# Patient Record
(demographics unavailable — no encounter records)

---

## 2024-12-03 NOTE — HISTORY OF PRESENT ILLNESS
[de-identified] : Pt is an 80 y/o male with left knee pain. He is status post left tibial plateau fracture years ago. He denies new history of trauma. He has pain which is constant. He has been told in the past that he does have advanced arthritis at the left knee. He has had prior cortisone injections, most recently on 5/2/22, which he states did initially help. \par  He additionally complains of left hip pain. He denies injury in this regard as well. He localizes his symptoms to the gluteal area. He denies numbness or tingling to the lower extremity.

## 2024-12-03 NOTE — PHYSICAL EXAM
[de-identified] : Patient is WDWN, alert, and in no acute distress. Breathing is unlabored. He is grossly oriented to person, place, and time.\par  \par  He is accompanied by his wife today.\par  \par  Left Lower Extremity:\par  No radicular symptoms to the left lower extremity. \par  Left sided paraspinal tenderness as well as through the left sided gluteal region.\par  There is a well healed incision site along the lateral aspect of the tibia.\par  No signs of infection noted.\par  There is tenderness noted posteriorly through the knee, with lateral joint line tenderness.\par  ROM to the left knee is full into flexion and extension.  [de-identified] : AP, lateral, oblique views of the LEFT knee were obtained and revealed a fully healed tibial plateau fracture with lateral plate and multiple screws in place. There is severe lateral compartment osteoarthritis.   AP and lateral views of the LEFT hip and pelvis were obtained today and revealed mild arthritic changes to the femoroacetabular joint.

## 2024-12-03 NOTE — HISTORY OF PRESENT ILLNESS
[de-identified] : Pt is an 82 y/o male with left knee pain. He is status post left tibial plateau fracture years ago. He denies new history of trauma. He has pain which is constant. He has been told in the past that he does have advanced arthritis at the left knee. He has had prior cortisone injections, most recently on 5/2/22, which he states did initially help. \par  He additionally complains of left hip pain. He denies injury in this regard as well. He localizes his symptoms to the gluteal area. He denies numbness or tingling to the lower extremity.

## 2024-12-03 NOTE — DISCUSSION/SUMMARY
[de-identified] : The underlying pathophysiology was reviewed with the patient. XR films were reviewed with the patient. Discussed at length the nature of the patient's condition. I informed the patient that his left knee symptoms are consistent with advanced arthritis of the lateral compartment. The patient and I discussed his options regarding treatment.  Patient was advised to take OTC medications and topical analgesic for pain management. He and his wife have deferred a new replacement, as she stated she has previously undergone a TKR and is not happy with her result. He has opted to proceed with a repeat cortisone injection at the left knee, given his relief in the past with prior cortisone injections.  I expressed to the patient that at this time, it would be in his best interest to obtain additional studies in the form of an MRI. I provided the patient a prescription to obtain an MRI of his Lumbar Spine.   The patient wishes to proceed with a cortisone injection at this time. The skin was prepped with alcohol and sprayed with Ethyl Chloride. An injection of 0.5 cc 1% Lidocaine without epinephrine, 0.25 cc Kenalog 40mg, and 0.25 cc. Dexamethasone was administered into the left knee. The patient tolerated the procedure well. Apply ice.  All questions answered, understanding verbalized. Patient in agreement with plan of care.   If the patient begins to experience any changes or severe exacerbation of his symptoms, he should reach out to me as soon as possible. Otherwise, he should return to me as needed.

## 2024-12-03 NOTE — PHYSICAL EXAM
[de-identified] : Patient is WDWN, alert, and in no acute distress. Breathing is unlabored. He is grossly oriented to person, place, and time.\par  \par  He is accompanied by his wife today.\par  \par  Left Lower Extremity:\par  No radicular symptoms to the left lower extremity. \par  Left sided paraspinal tenderness as well as through the left sided gluteal region.\par  There is a well healed incision site along the lateral aspect of the tibia.\par  No signs of infection noted.\par  There is tenderness noted posteriorly through the knee, with lateral joint line tenderness.\par  ROM to the left knee is full into flexion and extension.  [de-identified] : AP, lateral, oblique views of the LEFT knee were obtained and revealed a fully healed tibial plateau fracture with lateral plate and multiple screws in place. There is severe lateral compartment osteoarthritis.   AP and lateral views of the LEFT hip and pelvis were obtained today and revealed mild arthritic changes to the femoroacetabular joint.

## 2024-12-03 NOTE — ADDENDUM
[FreeTextEntry1] : I, Phillip Posada Jr, acted solely as a scribe for Dr. Herminio Marc on this date 12/03/2024  All medical record entries made by the Scribe were at my, Dr. Herminio Marc, direction and personally dictated by me on 12/03/2024 . I have reviewed the chart and agree that the record accurately reflects my personal performance of the history, physical exam, assessment and plan. I have also personally directed, reviewed, and agreed with the chart.

## 2024-12-03 NOTE — DISCUSSION/SUMMARY
[de-identified] : The underlying pathophysiology was reviewed with the patient. XR films were reviewed with the patient. Discussed at length the nature of the patient's condition. I informed the patient that his left knee symptoms are consistent with advanced arthritis of the lateral compartment. The patient and I discussed his options regarding treatment.  Patient was advised to take OTC medications and topical analgesic for pain management. He and his wife have deferred a new replacement, as she stated she has previously undergone a TKR and is not happy with her result. He has opted to proceed with a repeat cortisone injection at the left knee, given his relief in the past with prior cortisone injections.  I expressed to the patient that at this time, it would be in his best interest to obtain additional studies in the form of an MRI. I provided the patient a prescription to obtain an MRI of his Lumbar Spine.   The patient wishes to proceed with a cortisone injection at this time. The skin was prepped with alcohol and sprayed with Ethyl Chloride. An injection of 0.5 cc 1% Lidocaine without epinephrine, 0.25 cc Kenalog 40mg, and 0.25 cc. Dexamethasone was administered into the left knee. The patient tolerated the procedure well. Apply ice.  All questions answered, understanding verbalized. Patient in agreement with plan of care.   If the patient begins to experience any changes or severe exacerbation of his symptoms, he should reach out to me as soon as possible. Otherwise, he should return to me as needed.

## 2024-12-23 NOTE — DISCUSSION/SUMMARY
[FreeTextEntry1] : Overall doing well from a cardiac standpoint. Continue present medications. Follow up in 6 months.  [EKG obtained to assist in diagnosis and management of assessed problem(s)] : EKG obtained to assist in diagnosis and management of assessed problem(s)

## 2024-12-23 NOTE — HISTORY OF PRESENT ILLNESS
[FreeTextEntry1] : Mr. HART has a history of CAD. He denies a change in symptoms. He has no chest pain. No SOB. No palpitations.

## 2025-02-24 NOTE — ASSESSMENT
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  Plan: Obtain hospital records - 2/24/25 Lupus anticoagulant panel/V Leiden/Prothrombin G all negative; CT A/P negative Eliquis for at least 1 year CMP, LDH Dermatology f/u 4-10 ml lavendar and 1-7 ml red to CLL Tissue Bank next visit COVID booster and RSV vaccine as recommended Folic acid 1 mg daily Aspirin 81 mg daily -- has coronary stent Switch to sublingual B12 1000 mcg daily TEDS stocking on left to groin Avoid NSAIDs Avoid trauma RTC 1 month.

## 2025-02-24 NOTE — PHYSICAL EXAM
[Restricted in physically strenuous activity but ambulatory and able to carry out work of a light or sedentary nature] : Status 1- Restricted in physically strenuous activity but ambulatory and able to carry out work of a light or sedentary nature, e.g., light house work, office work [Normal] : affect appropriate [de-identified] : Tattoos. Multiple up to 1 x 1 cm scaly macules on limbs and trunk. Senile purpura and bruises on arms.

## 2025-02-24 NOTE — PHYSICAL EXAM
[Restricted in physically strenuous activity but ambulatory and able to carry out work of a light or sedentary nature] : Status 1- Restricted in physically strenuous activity but ambulatory and able to carry out work of a light or sedentary nature, e.g., light house work, office work [Normal] : affect appropriate [de-identified] : Tattoos. Multiple up to 1 x 1 cm scaly macules on limbs and trunk. Senile purpura and bruises on arms.

## 2025-02-24 NOTE — CONSULT LETTER
[Dear  ___] : Dear  [unfilled], [Courtesy Letter:] : I had the pleasure of seeing your patient, [unfilled], in my office today. [Please see my note below.] : Please see my note below. [Sincerely,] : Sincerely, [FreeTextEntry2] : Dr. Fabian [FreeTextEntry3] : Rios Andrade M.D., FACP\par  Professor of Medicine\par  [employer] [facility] [employer]\par  Associate Chief, Division of Hematology\par  [facility]\par  [facility]\par  [address] [place] [address]\[place] [address]\par  [phone]\par  \par  \par  \par

## 2025-02-24 NOTE — CONSULT LETTER
[Dear  ___] : Dear  [unfilled], [Courtesy Letter:] : I had the pleasure of seeing your patient, [unfilled], in my office today. [Please see my note below.] : Please see my note below. [Sincerely,] : Sincerely, [FreeTextEntry2] : Dr. Fabian [FreeTextEntry3] : Rios Andrade M.D., FACP\par  Professor of Medicine\par  [facility] [employer]\par  Associate Chief, Division of Hematology\par  [facility]\par  [facility]\par  [address] [place] [address]\[place] [address]\par  [phone]\par  \par  \par  \par

## 2025-02-24 NOTE — RESULTS/DATA
[FreeTextEntry1] : WBC 26,110 Hgb 12.4 Hct 37.9 .1 Platelets 184,000 Diff 11P 82L 2M 1Eo ANC 2,870  2/21/25 CMP Glu 121 eGFR 59   6/21/24 CMP Chloride 112 CO2 18 Glu 110 BUN 24  B12 399 Folate 11.2 Antiparietal Cell Ab <1:20 Intrinsic Factor Antibodies 1.1

## 2025-02-24 NOTE — HISTORY OF PRESENT ILLNESS
[Disease:__________________________] : Disease: [unfilled] [Chavez Stage: ___] : Chavez Stage: [unfilled] [de-identified] : 2/2014 Saddle pulmonary embolism with left femoral/soleal DVT [de-identified] : CD38--;IgVH mutated, ZAP70+; FISH -- [de-identified] : Patient was discharged on 2/14/25 following admission for left femoral and soleal deep veinous thrombophlebitis with saddle pulmonary embolism. He was anticoagulated with Heparin and then switched to Eliquis. He presented with back pain and pain in left calf and right thigh. He had not traveled nor been sedentary. He is feeling better. His back pain is improving. He reports  minimal dry cough at night which is resolving. He notes no fever, night sweats, SOB, swollen glands, abdominal pain, BRBPR, melena, bleeding, abnormal bruising. Weight is stable. He had the flu shot.

## 2025-02-24 NOTE — HISTORY OF PRESENT ILLNESS
[Disease:__________________________] : Disease: [unfilled] [Chavez Stage: ___] : Chavez Stage: [unfilled] [de-identified] : 2/2014 Saddle pulmonary embolism with left femoral/soleal DVT [de-identified] : CD38--;IgVH mutated, ZAP70+; FISH -- [de-identified] : Patient was discharged on 2/14/25 following admission for left femoral and soleal deep veinous thrombophlebitis with saddle pulmonary embolism. He was anticoagulated with Heparin and then switched to Eliquis. He presented with back pain and pain in left calf and right thigh. He had not traveled nor been sedentary. He is feeling better. His back pain is improving. He reports  minimal dry cough at night which is resolving. He notes no fever, night sweats, SOB, swollen glands, abdominal pain, BRBPR, melena, bleeding, abnormal bruising. Weight is stable. He had the flu shot.

## 2025-02-24 NOTE — ADDENDUM
[FreeTextEntry1] : I, Monique Turcios, acted solely as a scribe for Dr. Rios Andrade on 02/21/2025.   All medical entries made by the Scribe were at my, Dr. Rios Andrade's, direction and personally dictated by me on 02/21/2025 . I have reviewed the chart and agree that the record accurately reflects my personal performance of the history, physical exam, assessment and plan. I have also personally directed, reviewed, and agreed with the chart.

## 2025-03-05 NOTE — HISTORY OF PRESENT ILLNESS
[FreeTextEntry1] : Mr. HART presents for the evaluation of DVT and saddle PE He was noted to have saddle PE. Also noted to have left leg DVT. He does note some trauma to the leg in the past. He also recently had a long plane ride.  Since home he has felt well. NO chest pain. He has had a cough. Lungs were clear on exam and imaging.

## 2025-03-05 NOTE — DISCUSSION/SUMMARY
[FreeTextEntry1] : Int-high risk saddle PE Continue on Eliquis. Will discuss with Heme and Vascular cardiology duration of therapy. Seems hypercoag work up negative this far. He does have CLL, but no solid malignancy.  RV dysfunction noted on echo but no obvious signs or symptoms. Will repeat echo.  Same therapies for now. Follow up with Vascular Cardiology.  [EKG obtained to assist in diagnosis and management of assessed problem(s)] : EKG obtained to assist in diagnosis and management of assessed problem(s)

## 2025-03-10 NOTE — REVIEW OF SYSTEMS
[Weight Gain (___ Lbs)] : no recent weight gain [Feeling Fatigued] : not feeling fatigued [Weight Loss (___ Lbs)] : no recent weight loss [SOB] : no shortness of breath [Dyspnea on exertion] : not dyspnea during exertion [Chest Discomfort] : no chest discomfort [Lower Ext Edema] : no extremity edema [Leg Claudication] : no intermittent leg claudication [Palpitations] : no palpitations [Orthopnea] : no orthopnea [Syncope] : no syncope [Joint Pain] : joint pain [Joint Stiffness] : joint stiffness [Change In Color Of Skin] : change in skin color [Skin Lesions] : skin lesion(s): [Negative] : Heme/Lymph

## 2025-03-10 NOTE — REASON FOR VISIT
[Consultation] : a consultation regarding [Peripheral Vascular Disease] : peripheral vascular disease [FreeTextEntry1] : 83M w/ HTN, CAD, CLL. Recent admission for intermediate high risk PE, DVT. Here for vascular medicine evaluation.   Hospital course:  Presents w/ CP and back pain x1 day. Found to have saddle PE w/ right heart strain on imaging and elevated cardiac biomarkers w/ hemodynamic stability. Patient started on heparin gtt, and transitioned to DOAC as per recommendations of vascular cardiology.  Procedure: CTA chest w/w/o contrast Findings and Treatment: IMPRESSION: Acute saddle pulmonary emboli extending into all lobes, as described above, with suggestion of right heart strain. Mild bilateral peripheral reticular opacities, question interstitial lung disease.  Venous duplex: IMPRESSION: Deep venous thrombosis in the left femoral and soleal veins, above and below the knee.  CT abd/pelvis:  *  Prominent nonspecific periportal and gastrohepatic lymph nodes. *  Otherwise, noclear evidence of malignancy within the abdomen or pelvis.  TTE:  1. Left ventricular systolic function is normal.  2. Moderately enlarged right ventricular cavity size.  3. Right ventricular systolic function is reduced with apical sparing consistent with acute pulmonary embolism (Angeles's sign).  4. No clot in transit visualized.  5. Trace tricuspid regurgitation.  6. The inferior vena cava is dilated (dilated >2.1cm) with abnormal inspiratory collapse (abnormal <50%) consistent with elevated right atrial pressure ( R 15, range 10-20mmHg).  Managed with anticoagulation. Biopsy of LN deferred to outpatient. Thought to be provoked in the setting of CLL, multiple long flights and immobilization after falling in the ocean.

## 2025-03-10 NOTE — ASSESSMENT
[FreeTextEntry1] : #provoked PE/DVT (CLL, multiple airplane trips, sedentary after falling in the ocean). Feels well. Has been walking but not back to his exercise routine (walks on treadmill, light weights). No bleeding or bruising. Cardiologist is Dr. Negrete.   -therapeutic a/c; course to be determined -will need outpatient biopsy of LN -3 month sophia, TTE and venous duplex, 6MWT -reviewed signs and symptoms to seek emergency care -he can start back with his walking, slow uptaper as long as he feels well -f/u after above testing is completed  -f/u hematology

## 2025-03-10 NOTE — PHYSICAL EXAM
[Eyelids - No Xanthelasma] : the eyelids demonstrated no xanthelasmas [No Oral Cyanosis] : no oral cyanosis [Heart Rate And Rhythm] : heart rate and rhythm were normal [Heart Sounds] : normal S1 and S2 [Murmurs] : no murmurs present [Arterial Pulses Normal] : the arterial pulses were normal [Bowel Sounds] : normal bowel sounds [Abdomen Tenderness] : non-tender [] : no ischemic changes [FreeTextEntry1] : +spider veins [No Skin Ulcers] : no skin ulcer [Oriented To Time, Place, And Person] : oriented to person, place, and time [Impaired Insight] : insight and judgment were intact [Affect] : the affect was normal

## 2025-03-21 NOTE — RESULTS/DATA
[FreeTextEntry1] : WBC 23,530 Hgb 12.9 Hct 40.0 .2 Platelets 124,000 Diff 13P 82L 3M 1Eo ANC 3,020 ALC 19,300 CMP Glu 130, eGFR 70 LDH  150  6/21/24 B12 399 Folate 11.2 Antiparietal Cell Ab <1:20 Intrinsic Factor Antibodies 1.1

## 2025-03-21 NOTE — PHYSICAL EXAM
[Normal] : affect appropriate [Fully active, able to carry on all pre-disease performance without restriction] : Status 0 - Fully active, able to carry on all pre-disease performance without restriction [de-identified] : Tattoos. Multiple up to 1 x 1 cm scaly macules on limbs and trunk.

## 2025-03-21 NOTE — CONSULT LETTER
[Dear  ___] : Dear  [unfilled], [Courtesy Letter:] : I had the pleasure of seeing your patient, [unfilled], in my office today. [Please see my note below.] : Please see my note below. [Sincerely,] : Sincerely, [FreeTextEntry2] : Dr. Fabian [FreeTextEntry3] : Rios Andrade M.D., FACP\par  Professor of Medicine\par  [facility] [employer]\par  Associate Chief, Division of Hematology\par  [facility]\par  [facility]\par  [address] [place] [address]\[place] [address]\par  [phone]\par  \par  \par  \par

## 2025-03-21 NOTE — ADDENDUM
[FreeTextEntry1] : I, Brenda Danielito, acted solely as a scribe for Dr. Rios Andrade on 3/21/2025 .  All medical entries made by the Scribe were at my, Dr. Rios Andrade's, direction and personally dictated by me on 3/21/2025. I have reviewed the chart and agree that the record accurately reflects my personal performance of the history, physical exam, assessment and plan. I have also personally directed, reviewed, and agreed with the chart.

## 2025-03-21 NOTE — ASSESSMENT
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 Reviewed hospital records - 2/24/25 Lupus anticoagulant panel/V Leiden/Prothrombin G all negative; CT A/P negative  Plan: Continue Eliquis at full dose for at least 1 year. Would then continue lifelong on prophylactic dose. B12/folate levels Dermatology f/u 4-10 ml lavender and 1-7 ml red to CLL Tissue Bank next visit COVID booster and RSV vaccine as recommended Folic acid 1 mg daily Aspirin 81 mg daily -- has coronary stent Sublingual B12 1000 mcg daily TEDS stocking on left to groin Avoid NSAIDs Avoid trauma Consider Cologuard test RTC 3 months

## 2025-03-21 NOTE — HISTORY OF PRESENT ILLNESS
[Disease:__________________________] : Disease: [unfilled] [Chavez Stage: ___] : Chavez Stage: [unfilled] [de-identified] : 2/2025 Saddle pulmonary embolism with left femoral/soleal DVT, unprovoked [de-identified] : CD38--;IgVH mutated, ZAP70+; FISH -- [de-identified] :  He is feeling better. He notes no fever, night sweats, HA, visual problems, chest pain, cough, SOB, swollen glands, abdominal pain, BRBPR, melena, bleeding, bruising. Weight is fluctuating, gained 3lbs since last visit. He had the flu shot. Last colonoscopy was about 5 years ago and reportedly unremarkable.

## 2025-04-30 NOTE — HISTORY OF PRESENT ILLNESS
[FreeTextEntry1] : Repeat echo shows RV has returned to normal.  NO chest pain. No SOB His only complaint is low back pain and pain in the thighs.

## 2025-04-30 NOTE — DISCUSSION/SUMMARY
[FreeTextEntry1] : No DVT on dopplers. RV improved.  Suspect back and thigh pain is orthopedic. Rec he see PMD or ortho.  Will defer to heme and vascular duration and dose of anticoagulation.  Continue other cardiac medications.  [EKG obtained to assist in diagnosis and management of assessed problem(s)] : EKG obtained to assist in diagnosis and management of assessed problem(s)

## 2025-05-14 NOTE — HISTORY OF PRESENT ILLNESS
[FreeTextEntry1] : Carl presents with his wife for evaluation of anemia.  The patient was diagnosed with a saddle PE and DVT in 2/2025 when he was admitted at [facility].  He is currently on full dose Eliquis.  From a GI perspective, the patient feels well and denies any upper GI symptoms, abdominal pain, changes in bowel habits, overt GI bleeding, weight loss or FH of GI malignancies.  He reports a prior screening colonoscopy about 10 years ago and was advised he would not need to repeat it due to his age.  The patient has a history of CLL with a baseline hemoglobin of 13-14.  When the patient was admitted to [facility], his initial Hb was in the 13's but decreased to 11.3 on discharge.  His recent outpatient labs show improvement with Hb 12.4 on 2/21/2025 and 12.9 on 3/21/2025.

## 2025-05-14 NOTE — PHYSICAL EXAM
[Alert] : alert [Sclera] : the sclera and conjunctiva were normal [Hearing Threshold Finger Rub Not [place]] : hearing was normal [Normal Appearance] : the appearance of the neck was normal [No Respiratory Distress] : no respiratory distress [Auscultation Breath Sounds / Voice Sounds] : lungs were clear to auscultation bilaterally [Heart Rate And Rhythm] : heart rate was normal and rhythm regular [Normal S1, S2] : normal S1 and S2 [Bowel Sounds] : normal bowel sounds [Abdomen Tenderness] : non-tender [No Masses] : no abdominal mass palpated [Abdomen Soft] : soft [No Rectal Mass] : no rectal mass [Occult Blood] : negative occult blood [FIT Test] : negative FIT test [Abnormal Walk] : normal gait [Normal Color / Pigmentation] : normal skin color and pigmentation [No Focal Deficits] : no focal deficits [Oriented To Time, Place, And Person] : oriented to person, place, and time [de-identified] : external hemorrhoid, brown stool

## 2025-05-14 NOTE — ASSESSMENT
[FreeTextEntry1] : 1.  Acute anemia, with recent improvement.  FIT and Hemoccult negative today.  Last colonoscopy about 10 years ago. 2.  History of B12 deficiency.  Prior intrinsic factor and antiparietal cell antibody normal. 3.  Saddle PE/DVT (2/2025) on Eliquis. 4.  CLL. 5.  CAD s/p stent. 6.  HTN.  Recs: - Recent CBC, CMP, B12 reviewed.  Prior hospital lab trends and imaging studies reviewed. - FIT and Hemoccult testing negative today.  Patient was advised that Cologuard test can be ordered but may result in false positive in setting of Eliquis use. - Patient to follow-up with hematology for surveillance CBC.  If hemoglobin continues to improve, would favor conservative management given recent saddle PE on anticoagulation.

## 2025-05-29 NOTE — PHYSICAL EXAM
[de-identified] : Patient is WDWN, alert, and in no acute distress. Breathing is unlabored. He is grossly oriented to person, place, and time.  He is accompanied by his wife today.  Left knee: There is a healed midline scar Range of motion:15 extension - 120  flexion Tests and Signs: All tests for stability are normal. Strength: flexion and extension 5/5  Right Lower Extremity Buttock: No tenderness. No swelling. No deformities Hip: Inspection/Palpation: No tenderness. No swelling. No deformities Range of Motion: full and extension, flexion, adduction, abduction, internal rotation and external rotation  Stability: joint stability intact Strength: extension, flexion, adduction, abduction, internal rotation and external rotation 5/5    [de-identified] :   AP, lateral, oblique views of the LEFT knee were obtained and revealed a fully healed tibial plateau fracture with lateral plate and multiple screws in place. There is severe lateral compartment osteoarthritis.

## 2025-05-29 NOTE — HISTORY OF PRESENT ILLNESS
[de-identified] : Pt is an 80 y/o male with left knee pain. He is status post left tibial plateau fracture years ago. He denies new history of trauma. He has pain which is constant. He has been told in the past that he does have advanced arthritis at the left knee. He has had prior cortisone injections, most recently on 5/2/22, which he states did initially help.  He additionally complains of left hip pain. He denies injury in this regard as well. He localizes his symptoms to the gluteal area. He denies numbness or tingling to the lower extremity.   Today, 05/29/2025, the patient presents for a follow-up evaluation and further care. He c/o of bilateral knee pain and is requesting an injection in his left.  He had a left knee injection last visit which last 6 months.

## 2025-05-29 NOTE — ADDENDUM
[FreeTextEntry1] : I, Get Bone wrote this note acting as a scribe for Dr. Herminio Marc on 05/29/2025.   All medical record entries made by the Scribe were at my, Dr. Herminio aMrc MD., direction and personally dictated by me on 05/29/2025. I have personally reviewed the chart and agree that the record accurately reflects my personal performance of the history, physical exam, assessment and plan.

## 2025-05-29 NOTE — HISTORY OF PRESENT ILLNESS
[de-identified] : Pt is an 80 y/o male with left knee pain. He is status post left tibial plateau fracture years ago. He denies new history of trauma. He has pain which is constant. He has been told in the past that he does have advanced arthritis at the left knee. He has had prior cortisone injections, most recently on 5/2/22, which he states did initially help.  He additionally complains of left hip pain. He denies injury in this regard as well. He localizes his symptoms to the gluteal area. He denies numbness or tingling to the lower extremity.   Today, 05/29/2025, the patient presents for a follow-up evaluation and further care. He c/o of bilateral knee pain and is requesting an injection in his left.  He had a left knee injection last visit which last 6 months.

## 2025-05-29 NOTE — PHYSICAL EXAM
[de-identified] : Patient is WDWN, alert, and in no acute distress. Breathing is unlabored. He is grossly oriented to person, place, and time.  He is accompanied by his wife today.  Left knee: There is a healed midline scar Range of motion:15 extension - 120  flexion Tests and Signs: All tests for stability are normal. Strength: flexion and extension 5/5  Right Lower Extremity Buttock: No tenderness. No swelling. No deformities Hip: Inspection/Palpation: No tenderness. No swelling. No deformities Range of Motion: full and extension, flexion, adduction, abduction, internal rotation and external rotation  Stability: joint stability intact Strength: extension, flexion, adduction, abduction, internal rotation and external rotation 5/5    [de-identified] :   AP, lateral, oblique views of the LEFT knee were obtained and revealed a fully healed tibial plateau fracture with lateral plate and multiple screws in place. There is severe lateral compartment osteoarthritis.

## 2025-05-29 NOTE — ADDENDUM
[FreeTextEntry1] : I, Get Bone wrote this note acting as a scribe for Dr. Herminio Marc on 05/29/2025.   All medical record entries made by the Scribe were at my, Dr. Herminio Marc MD., direction and personally dictated by me on 05/29/2025. I have personally reviewed the chart and agree that the record accurately reflects my personal performance of the history, physical exam, assessment and plan.

## 2025-05-29 NOTE — DISCUSSION/SUMMARY
[de-identified] : The underlying pathophysiology was reviewed with the patient. XR films were reviewed with the patient. Discussed at length the nature of the patients condition. The left knee symptoms are secondary to advanced arthritis of the lateral compartment.  The patient wishes to proceed with a cortisone injection today. Under sterile precautions, an injection of 5cc 1% lidocaine without epinephrine and 0.5cc Kenalog 40mg, 0.5cc Dexamethasone was administered into the left knee #2.  The patient tolerated the procedure well.  Walk regularly. Gentle range of motion, stretching, and strengthening exercises were encouraged. Increase activity as tolerated. No restriction on ADLs or physical activities as tolerated.   Maintain healthy diet and exercise.   All questions answered, understanding verbalized. Patient in agreement with plan of care. The patient can follow-up as needed.  If the patient begins to experience any changes or severe exacerbation of his symptoms, he should reach out to me as soon as possible.

## 2025-05-29 NOTE — DISCUSSION/SUMMARY
[de-identified] : The underlying pathophysiology was reviewed with the patient. XR films were reviewed with the patient. Discussed at length the nature of the patients condition. The left knee symptoms are secondary to advanced arthritis of the lateral compartment.  The patient wishes to proceed with a cortisone injection today. Under sterile precautions, an injection of 5cc 1% lidocaine without epinephrine and 0.5cc Kenalog 40mg, 0.5cc Dexamethasone was administered into the left knee #2.  The patient tolerated the procedure well.  Walk regularly. Gentle range of motion, stretching, and strengthening exercises were encouraged. Increase activity as tolerated. No restriction on ADLs or physical activities as tolerated.   Maintain healthy diet and exercise.   All questions answered, understanding verbalized. Patient in agreement with plan of care. The patient can follow-up as needed.  If the patient begins to experience any changes or severe exacerbation of his symptoms, he should reach out to me as soon as possible.

## 2025-06-22 NOTE — CONSULT LETTER
[Dear  ___] : Dear  [unfilled], [Courtesy Letter:] : I had the pleasure of seeing your patient, [unfilled], in my office today. [Please see my note below.] : Please see my note below. [Sincerely,] : Sincerely, [FreeTextEntry2] : Dr. Fabian [FreeTextEntry3] : Rios Andrade M.D., FACP\par  Professor of Medicine\par  [facility] [employer] [facility] [employer] [facility] [employer]\par  Associate Chief, Division of Hematology\par  [facility]\par  [facility]\par  [address] [place] [address]\[place] [address]\par  [phone]\par  \par  \par  \par

## 2025-06-22 NOTE — ASSESSMENT
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 Reviewed hospital records - 2/24/25 Lupus anticoagulant panel/V Leiden/Prothrombin G all negative; CT A/P negative.   Saw GI, Dr Blair, who does not think colonoscopy, endoscopy is warranted because of age.      Plan: Continue Eliquis at full dose for at least 1 year. Would then continue lifelong on prophylactic dose. B12/folate levels COVID booster and RSV vaccine as recommended Folic acid 1 mg daily Aspirin 81 mg daily -- has coronary stent Sublingual B12 1000 mcg daily TEDS stocking on left to groin Avoid NSAIDs Avoid trauma Consider Cologuard test RTC 3 months

## 2025-06-22 NOTE — CONSULT LETTER
[Dear  ___] : Dear  [unfilled], [Courtesy Letter:] : I had the pleasure of seeing your patient, [unfilled], in my office today. [Please see my note below.] : Please see my note below. [Sincerely,] : Sincerely, [FreeTextEntry2] : Dr. Fabian [FreeTextEntry3] : Rios Andrade M.D., FACP\par  Professor of Medicine\par  [facility] [employer]\par  Associate Chief, Division of Hematology\par  [facility]\par  [facility]\par  [address] [place] [address]\[place] [address]\par  [phone]\par  \par  \par  \par

## 2025-06-22 NOTE — HISTORY OF PRESENT ILLNESS
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 He notes no fever, night sweats, HA, visual problems, chest pain, cough, SOB, swollen glands, abdominal pain, BRBPR, melena, bleeding, bruising.  Weight is stable.  Last colonoscopy was about 5 years ago and reportedly unremarkable.

## 2025-06-22 NOTE — HISTORY OF PRESENT ILLNESS
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 He notes no fever, night sweats, HA, visual problems, chest pain, cough, SOB, swollen glands, abdominal pain, BRBPR, melena, bleeding, bruising.  Weight is stable.  Last colonoscopy was about 5 years ago and reportedly unremarkable.

## 2025-06-22 NOTE — PHYSICAL EXAM
[Fully active, able to carry on all pre-disease performance without restriction] : Status 0 - Fully active, able to carry on all pre-disease performance without restriction [Normal] : affect appropriate [de-identified] : Tattoos. Multiple up to 1 x 1 cm scaly macules on limbs and trunk.

## 2025-06-22 NOTE — PHYSICAL EXAM
[Fully active, able to carry on all pre-disease performance without restriction] : Status 0 - Fully active, able to carry on all pre-disease performance without restriction [Normal] : affect appropriate [de-identified] : Tattoos. Multiple up to 1 x 1 cm scaly macules on limbs and trunk.